# Patient Record
Sex: MALE | Race: WHITE | HISPANIC OR LATINO | Employment: FULL TIME | URBAN - METROPOLITAN AREA
[De-identification: names, ages, dates, MRNs, and addresses within clinical notes are randomized per-mention and may not be internally consistent; named-entity substitution may affect disease eponyms.]

---

## 2017-03-04 ENCOUNTER — HOSPITAL ENCOUNTER (EMERGENCY)
Facility: MEDICAL CENTER | Age: 52
End: 2017-03-04
Attending: EMERGENCY MEDICINE
Payer: COMMERCIAL

## 2017-03-04 VITALS
HEART RATE: 86 BPM | WEIGHT: 218.48 LBS | RESPIRATION RATE: 16 BRPM | HEIGHT: 66 IN | DIASTOLIC BLOOD PRESSURE: 102 MMHG | BODY MASS INDEX: 35.11 KG/M2 | SYSTOLIC BLOOD PRESSURE: 161 MMHG | OXYGEN SATURATION: 96 % | TEMPERATURE: 98.6 F

## 2017-03-04 DIAGNOSIS — M54.30 SCIATICA, UNSPECIFIED LATERALITY: ICD-10-CM

## 2017-03-04 DIAGNOSIS — S39.012A LUMBAR STRAIN, INITIAL ENCOUNTER: ICD-10-CM

## 2017-03-04 PROCEDURE — 99283 EMERGENCY DEPT VISIT LOW MDM: CPT

## 2017-03-04 RX ORDER — CYCLOBENZAPRINE HCL 10 MG
10 TABLET ORAL 3 TIMES DAILY PRN
Qty: 30 TAB | Refills: 0 | Status: SHIPPED | OUTPATIENT
Start: 2017-03-04

## 2017-03-04 RX ORDER — HYDROCODONE BITARTRATE AND ACETAMINOPHEN 7.5; 325 MG/1; MG/1
1 TABLET ORAL EVERY 8 HOURS PRN
Qty: 20 TAB | Refills: 0 | Status: SHIPPED | OUTPATIENT
Start: 2017-03-04

## 2017-03-04 RX ORDER — PREDNISONE 20 MG/1
20 TABLET ORAL DAILY
Qty: 6 TAB | Refills: 0 | Status: SHIPPED | OUTPATIENT
Start: 2017-03-04

## 2017-03-04 ASSESSMENT — LIFESTYLE VARIABLES: DO YOU DRINK ALCOHOL: NO

## 2017-03-04 NOTE — ED AVS SNAPSHOT
[employer] Access Code: [email or web address]  Expires: 4/3/2017 12:35 PM    Your email address is not on file at [facility].  Email Addresses are required for you to sign up for [employer], please contact [phone] to verify your personal information and to provide your email address prior to attempting to register for [employer].    Shaq Holbrook  [address] [place] [address]    [employer]  A secure, online tool to manage your health information     [facility]’s [employer]® is a secure, online tool that connects you to your personalized health information from the privacy of your home -- day or night - making it very easy for you to manage your healthcare. Once the activation process is completed, you can even access your medical information using the [employer] stephan, which is available for free in the Apple Stephan store or Google Play store.     To learn more about [employer], visit [email or web address]    There are two levels of access available (as shown below):   My Chart Features  [facility] Primary Care Doctor [facility]  Specialists [facility]  Urgent  Care Non-[facility] Primary Care Doctor   Email your healthcare team securely and privately 24/7 X X X    Manage appointments: schedule your next appointment; view details of past/upcoming appointments X      Request prescription refills. X      View recent personal medical records, including lab and immunizations X X X X   View health record, including health history, allergies, medications X X X X   Read reports about your outpatient visits, procedures, consult and ER notes X X X X   See your discharge summary, which is a recap of your hospital and/or ER visit that includes your diagnosis, lab results, and care plan X X  X     How to register for [employer]:  Once your e-mail address has been verified, follow the following steps to sign up for [employer].     1. Go to  [email or web address]  2. Click on the Sign Up Now box, which takes you to the New Member Sign Up page. You will  need to provide the following information:  a. Enter your [employer] Access Code exactly as it appears at the top of this page. (You will not need to use this code after you’ve completed the sign-up process. If you do not sign up before the expiration date, you must request a new code.)   b. Enter your date of birth.   c. Enter your home email address.   d. Click Submit, and follow the next screen’s instructions.  3. Create a [employer] ID. This will be your [employer] login ID and cannot be changed, so think of one that is secure and easy to remember.  4. Create a [employer] password. You can change your password at any time.  5. Enter your Password Reset Question and Answer. This can be used at a later time if you forget your password.   6. Enter your e-mail address. This allows you to receive e-mail notifications when new information is available in [employer].  7. Click Sign Up. You can now view your health information.    For assistance activating your [employer] account, call [phone]

## 2017-03-04 NOTE — ED PROVIDER NOTES
"ED Provider Note    CHIEF COMPLAINT  Chief Complaint   Patient presents with   • Low Back Pain     Pt BIB self to triage for c/o left side low back pain that radiates down left leg to knee/ reports to feels like burning. pt denies trauma.    • Leg Pain       HPI  Shaq Holbrook is a 51 y.o. male who presents complaining of some mild to moderate pain radiating down his left buttocks. Pain started over the past 3-5 days. No weakness in his arms or legs. No problems with his walking. No bowel or bladder dysfunction. No weight loss. He works as a cook. He does some lifting at home.    REVIEW OF SYSTEMS  No Bowel or bladder dysfunction. No weakness in his arms or legs.  ALL OTHER SYSTEMS NEGATIVE    ALLERGIES  No Known Allergies    PAST MEDICAL HISTORY  Negative    FAMILY HISTORY  Negative    SOCIAL HISTORY  Negative    PHYSICAL EXAM  GENERAL: Alert male adult  VITAL SIGNS: /102 mmHg  Pulse 86  Temp(Src) 37 °C (98.6 °F)  Resp 16  Ht 1.676 m (5' 6\")  Wt 99.1 kg (218 lb 7.6 oz)  BMI 35.28 kg/m2  SpO2 96%   Constitutional: Alert healthy-appearing adult HENT: Scalp is normal size and nontender. Ears   Eyes: Pupils equal round and reactive to light, extraocular motor fall. There is no scleral icterus.  Cardiovascular: Heart regular rhythm without murmurs or gallops   Thorax & Lungs: No chest wall tenderness. Lungs are clear. Patient has good breath sounds bilateral. No rales, no rhonchi, no wheezes.  Abdomen: Abdomen is soft, nontender, not rigid, no guarding, and no organomegaly. There is no palpable hernia   Skin: Warm, pink, and dry with no erythema and no rash.   Back: Nontender, no midline bony tenderness, no flank tenderness.  Extremities: Full range of motion  No tenderness to palpation and no deformities noted. No calf or thigh swelling. No calf or thigh tenderness. No clinical DVT.  Neurologic: Alert & oriented . Cranial nerves are grossly intact as tested. Patient moves all 4 extremities well. " Patient has good strong flexion and extension of the ankle joints knee joints hip joints and elbow joints. Sensation is normal and symmetrical in the upper and lower extremities.   Psychiatric: Patient is alert oriented coherent and rational.     COURSE & MEDICAL DECISION MAKING  Assessment musculoskeletal left sciatic pain. No neurological deficit. Stable for discharge.    Treatment: #1 icepack #2. Instructions on sciatica and back pain given #3. Follow up with the [facility] this week. #4. Prescription for prednisone, hydrocodone, Flexeril.    FINAL IMPRESSION  1. Sciatica and lumbar strain.      Electronically signed by: Gary Gansert, 3/4/2017 12:31 PM

## 2017-03-04 NOTE — DISCHARGE INSTRUCTIONS
Back Exercises  Back exercises help treat and prevent back injuries. The goal of back exercises is to increase the strength of your abdominal and back muscles and the flexibility of your back. These exercises should be started when you no longer have back pain. Back exercises include:  · Pelvic Tilt. Lie on your back with your knees bent. Tilt your pelvis until the lower part of your back is against the floor. Hold this position 5 to 10 sec and repeat 5 to 10 times.  · Knee to Chest. Pull first 1 knee up against your chest and hold for 20 to 30 seconds, repeat this with the other knee, and then both knees. This may be done with the other leg straight or bent, whichever feels better.  · Sit-Ups or Curl-Ups. Bend your knees 90 degrees. Start with tilting your pelvis, and do a partial, slow sit-up, lifting your trunk only 30 to 45 degrees off the floor. Take at least 2 to 3 seconds for each sit-up. Do not do sit-ups with your knees out straight. If partial sit-ups are difficult, simply do the above but with only tightening your abdominal muscles and holding it as directed.  · Hip-Lift. Lie on your back with your knees flexed 90 degrees. Push down with your feet and shoulders as you raise your hips a couple inches off the floor; hold for 10 seconds, repeat 5 to 10 times.  · Back arches. Lie on your stomach, propping yourself up on bent elbows. Slowly press on your hands, causing an arch in your low back. Repeat 3 to 5 times. Any initial stiffness and discomfort should lessen with repetition over time.  · Shoulder-Lifts. Lie face down with arms beside your body. Keep hips and torso pressed to floor as you slowly lift your head and shoulders off the floor.  Do not overdo your exercises, especially in the beginning. Exercises may cause you some mild back discomfort which lasts for a few minutes; however, if the pain is more severe, or lasts for more than 15 minutes, do not continue exercises until you see your caregiver.  Improvement with exercise therapy for back problems is slow.   See your caregivers for assistance with developing a proper back exercise program.     This information is not intended to replace advice given to you by your health care provider. Make sure you discuss any questions you have with your health care provider.     Document Released: 01/25/2006 Document Revised: 03/11/2013 Document Reviewed: 02/11/2016  [employer] Interactive Patient Education ©2016 [employer]    Gluteal Strain  The muscles in your butt (buttocks) are called gluteal muscles. A gluteal strain means that the muscles are stretched or have small tears. This can cause pain or stiffness in your buttocks. You also might feel pain in your lower back.   Anyone can strain gluteal muscles. However, it happens often to dancers, runners, and other athletes.  CAUSES   There are various causes of gluteal strain. They include:   · Stretching the gluteal muscles too far.  · Putting too much stress on the muscles before they are warmed up.  · Overusing the muscles.  · Getting hit hard on the gluteal muscles (a bruise or contusion).  Gluteal strain is more likely to happen when:  · You are working out in cold weather.  · You are tired.  · You are doing exercise that requires a sudden burst of activity. Sprinting is an example.  SYMPTOMS   · Pain in the buttocks when moving the leg. Sometimes the pain extends into the lower back.  · Tenderness in the buttocks.  · Stiffness or weakness of the buttocks.  · Bruising.  DIAGNOSIS   To decide if you have gluteal strain, a health care provider will probably:  · Ask what symptoms you have.  · Ask about the location of your pain, when it started, and when it occurs.  · Review your overall health.  · Physically examine the buttocks muscles.  · Have you do some hip motion exercises. They are called range of motion exercises. You will move your leg in specific ways, sometimes with the health care provider pushing against  your leg. This will show where the pain is coming from.  · Order a magnetic resonance imaging (MRI) scan. This machine uses a magnet and a computer to take pictures of your muscles and tendons. This can help find the cause of your pain. It also may show how severe the strain is. Your strain may be rated:  · Grade 1 strain (mild): Muscles are stretched. There might be very tiny tears. This type of strain should heal in about a week.  · Grade 2 strain (moderate): Muscles are partly torn. It may take one to two months to heal.  · Grade 3 strain (more severe): Muscles are ruptured (completely torn). This is rare with gluteal muscles. A severe strain can take more than three months to heal. A severe strain may require surgery, but this is rare.  TREATMENT   Several treatment methods can be used. Be sure to discuss the options with your health care provider. They include:  · Rest. Take a break from the activity that caused the injury.  · Cold packs. Applying cold to your buttocks may ease swelling and pain.  · Pain medication. Only take over-the-counter medicines for pain and discomfort as directed by your health care provider.  · Physical therapy. You will work on specific exercises to get the gluteal muscles back in shape. These exercises usually involve stretching. A physical therapist can show you what to do and what not to do as you heal.  HOME CARE INSTRUCTIONS   · Take any pain medication suggested by your health care provider. Follow the directions carefully.  · Use cold packs as directed by your health care provider or physical therapist.  · If you have physical therapy, follow through with the therapist's suggestions. Be sure you understand the exercises you will be doing, including:  ¨ How often the exercises should be done.  ¨ How many times each exercise should be repeated.  ¨ How long they should be done.  ¨ Other activities you should or should not do.  · Learn as much as you can about training for your  sport or exercise. This should help you avoid future muscle strains.  SEEK MEDICAL CARE IF:   · Pain, stiffness, or weakness gets worse.  · You have questions about any medications.     This information is not intended to replace advice given to you by your health care provider. Make sure you discuss any questions you have with your health care provider.     Document Released: 10/15/2010 Document Revised: 01/08/2016 Document Reviewed: 10/15/2010  [employer] Interactive Patient Education ©2016 [employer]    Lumbosacral Strain  Lumbosacral strain is a strain of any of the parts that make up your lumbosacral vertebrae. Your lumbosacral vertebrae are the bones that make up the lower third of your backbone. Your lumbosacral vertebrae are held together by muscles and tough, fibrous tissue (ligaments).   CAUSES   A sudden blow to your back can cause lumbosacral strain. Also, anything that causes an excessive stretch of the muscles in the low back can cause this strain. This is typically seen when people exert themselves strenuously, fall, lift heavy objects, bend, or crouch repeatedly.  RISK FACTORS  · Physically demanding work.  · Participation in pushing or pulling sports or sports that require a sudden twist of the back (tennis, golf, baseball).  · Weight lifting.  · Excessive lower back curvature.  · Forward-tilted pelvis.  · Weak back or abdominal muscles or both.  · Tight hamstrings.  SIGNS AND SYMPTOMS   Lumbosacral strain may cause pain in the area of your injury or pain that moves (radiates) down your leg.   DIAGNOSIS  Your health care provider can often diagnose lumbosacral strain through a physical exam. In some cases, you may need tests such as X-ray exams.   TREATMENT   Treatment for your lower back injury depends on many factors that your clinician will have to evaluate. However, most treatment will include the use of anti-inflammatory medicines.  HOME CARE INSTRUCTIONS   · Avoid hard physical activities  (tennis, racquetball, waterskiing) if you are not in proper physical condition for it. This may aggravate or create problems.  · If you have a back problem, avoid sports requiring sudden body movements. Swimming and walking are generally safer activities.  · Maintain good posture.  · Maintain a healthy weight.  · For acute conditions, you may put ice on the injured area.  ¨ Put ice in a plastic bag.  ¨ Place a towel between your skin and the bag.  ¨ Leave the ice on for 20 minutes, 2-3 times a day.  · When the low back starts healing, stretching and strengthening exercises may be recommended.  SEEK MEDICAL CARE IF:  · Your back pain is getting worse.  · You experience severe back pain not relieved with medicines.  SEEK IMMEDIATE MEDICAL CARE IF:   · You have numbness, tingling, weakness, or problems with the use of your arms or legs.  · There is a change in bowel or bladder control.  · You have increasing pain in any area of the body, including your belly (abdomen).  · You notice shortness of breath, dizziness, or feel faint.  · You feel sick to your stomach (nauseous), are throwing up (vomiting), or become sweaty.  · You notice discoloration of your toes or legs, or your feet get very cold.  MAKE SURE YOU:   · Understand these instructions.  · Will watch your condition.  · Will get help right away if you are not doing well or get worse.     This information is not intended to replace advice given to you by your health care provider. Make sure you discuss any questions you have with your health care provider.     Document Released: 09/27/2006 Document Revised: 01/08/2016 Document Reviewed: 08/06/2014  [employer] Interactive Patient Education ©2016 [employer]

## 2017-03-04 NOTE — ED NOTES
Chief Complaint   Patient presents with   • Low Back Pain     Pt BIB self to triage for c/o left side low back pain that radiates down left leg to knee/ reports to feels like burning. pt denies trauma.    • Leg Pain     Explained to pt triage process, made pt aware to tell this RN of any changes/concerns, pt verbalized understanding of process and instructions given. Pt to ER lobby.

## 2017-03-04 NOTE — ED AVS SNAPSHOT
Home Care Instructions                                                                                                                Shaq Holbrook   MRN: [id number]    Department:  [facility], Emergency Dept   Date of Visit:  3/4/2017            [facility], Emergency Dept    [address]    [place] [address]    Phone:  [phone]      You were seen by     Gary Gansert, M.D.      Your Diagnosis Was     Sciatica, unspecified laterality     M54.30       Follow-up Information     1. Follow up with [facility]. Schedule an appointment as soon as possible for a visit in 2 days.    Contact information    [facility]  [place] [address]  [phone]        Medication Information     Review all of your home medications and newly ordered medications with your primary doctor and/or pharmacist as soon as possible. Follow medication instructions as directed by your doctor and/or pharmacist.     Please keep your complete medication list with you and share with your physician. Update the information when medications are discontinued, doses are changed, or new medications (including over-the-counter products) are added; and carry medication information at all times in the event of emergency situations.               Medication List      START taking these medications        Instructions    cyclobenzaprine 10 MG Tabs   Commonly known as:  FLEXERIL    Take 1 Tab by mouth 3 times a day as needed.   Dose:  10 mg       hydrocodone-acetaminophen 7.5-325 MG per tablet   Commonly known as:  NORCO    Take 1 Tab by mouth every 8 hours as needed.   Dose:  1 Tab       predniSONE 20 MG Tabs   Commonly known as:  DELTASONE    Take 1 Tab by mouth every day.   Dose:  20 mg               Procedures and tests performed during your visit     NURSING COMMUNICATION        Discharge Instructions       Back Exercises  Back exercises help treat and prevent back injuries. The goal of back exercises is  to increase the strength of your abdominal and back muscles and the flexibility of your back. These exercises should be started when you no longer have back pain. Back exercises include:  · Pelvic Tilt. Lie on your back with your knees bent. Tilt your pelvis until the lower part of your back is against the floor. Hold this position 5 to 10 sec and repeat 5 to 10 times.  · Knee to Chest. Pull first 1 knee up against your chest and hold for 20 to 30 seconds, repeat this with the other knee, and then both knees. This may be done with the other leg straight or bent, whichever feels better.  · Sit-Ups or Curl-Ups. Bend your knees 90 degrees. Start with tilting your pelvis, and do a partial, slow sit-up, lifting your trunk only 30 to 45 degrees off the floor. Take at least 2 to 3 seconds for each sit-up. Do not do sit-ups with your knees out straight. If partial sit-ups are difficult, simply do the above but with only tightening your abdominal muscles and holding it as directed.  · Hip-Lift. Lie on your back with your knees flexed 90 degrees. Push down with your feet and shoulders as you raise your hips a couple inches off the floor; hold for 10 seconds, repeat 5 to 10 times.  · Back arches. Lie on your stomach, propping yourself up on bent elbows. Slowly press on your hands, causing an arch in your low back. Repeat 3 to 5 times. Any initial stiffness and discomfort should lessen with repetition over time.  · Shoulder-Lifts. Lie face down with arms beside your body. Keep hips and torso pressed to floor as you slowly lift your head and shoulders off the floor.  Do not overdo your exercises, especially in the beginning. Exercises may cause you some mild back discomfort which lasts for a few minutes; however, if the pain is more severe, or lasts for more than 15 minutes, do not continue exercises until you see your caregiver. Improvement with exercise therapy for back problems is slow.   See your caregivers for assistance  with developing a proper back exercise program.     This information is not intended to replace advice given to you by your health care provider. Make sure you discuss any questions you have with your health care provider.     Document Released: 01/25/2006 Document Revised: 03/11/2013 Document Reviewed: 02/11/2016  [employer] Interactive Patient Education ©2016 [employer]    Gluteal Strain  The muscles in your butt (buttocks) are called gluteal muscles. A gluteal strain means that the muscles are stretched or have small tears. This can cause pain or stiffness in your buttocks. You also might feel pain in your lower back.   Anyone can strain gluteal muscles. However, it happens often to dancers, runners, and other athletes.  CAUSES   There are various causes of gluteal strain. They include:   · Stretching the gluteal muscles too far.  · Putting too much stress on the muscles before they are warmed up.  · Overusing the muscles.  · Getting hit hard on the gluteal muscles (a bruise or contusion).  Gluteal strain is more likely to happen when:  · You are working out in cold weather.  · You are tired.  · You are doing exercise that requires a sudden burst of activity. Sprinting is an example.  SYMPTOMS   · Pain in the buttocks when moving the leg. Sometimes the pain extends into the lower back.  · Tenderness in the buttocks.  · Stiffness or weakness of the buttocks.  · Bruising.  DIAGNOSIS   To decide if you have gluteal strain, a health care provider will probably:  · Ask what symptoms you have.  · Ask about the location of your pain, when it started, and when it occurs.  · Review your overall health.  · Physically examine the buttocks muscles.  · Have you do some hip motion exercises. They are called range of motion exercises. You will move your leg in specific ways, sometimes with the health care provider pushing against your leg. This will show where the pain is coming from.  · Order a magnetic resonance imaging  (MRI) scan. This machine uses a magnet and a computer to take pictures of your muscles and tendons. This can help find the cause of your pain. It also may show how severe the strain is. Your strain may be rated:  · Grade 1 strain (mild): Muscles are stretched. There might be very tiny tears. This type of strain should heal in about a week.  · Grade 2 strain (moderate): Muscles are partly torn. It may take one to two months to heal.  · Grade 3 strain (more severe): Muscles are ruptured (completely torn). This is rare with gluteal muscles. A severe strain can take more than three months to heal. A severe strain may require surgery, but this is rare.  TREATMENT   Several treatment methods can be used. Be sure to discuss the options with your health care provider. They include:  · Rest. Take a break from the activity that caused the injury.  · Cold packs. Applying cold to your buttocks may ease swelling and pain.  · Pain medication. Only take over-the-counter medicines for pain and discomfort as directed by your health care provider.  · Physical therapy. You will work on specific exercises to get the gluteal muscles back in shape. These exercises usually involve stretching. A physical therapist can show you what to do and what not to do as you heal.  HOME CARE INSTRUCTIONS   · Take any pain medication suggested by your health care provider. Follow the directions carefully.  · Use cold packs as directed by your health care provider or physical therapist.  · If you have physical therapy, follow through with the therapist's suggestions. Be sure you understand the exercises you will be doing, including:  ¨ How often the exercises should be done.  ¨ How many times each exercise should be repeated.  ¨ How long they should be done.  ¨ Other activities you should or should not do.  · Learn as much as you can about training for your sport or exercise. This should help you avoid future muscle strains.  SEEK MEDICAL CARE IF:    · Pain, stiffness, or weakness gets worse.  · You have questions about any medications.     This information is not intended to replace advice given to you by your health care provider. Make sure you discuss any questions you have with your health care provider.     Document Released: 10/15/2010 Document Revised: 01/08/2016 Document Reviewed: 10/15/2010  [employer] Interactive Patient Education ©2016 [employer]    Lumbosacral Strain  Lumbosacral strain is a strain of any of the parts that make up your lumbosacral vertebrae. Your lumbosacral vertebrae are the bones that make up the lower third of your backbone. Your lumbosacral vertebrae are held together by muscles and tough, fibrous tissue (ligaments).   CAUSES   A sudden blow to your back can cause lumbosacral strain. Also, anything that causes an excessive stretch of the muscles in the low back can cause this strain. This is typically seen when people exert themselves strenuously, fall, lift heavy objects, bend, or crouch repeatedly.  RISK FACTORS  · Physically demanding work.  · Participation in pushing or pulling sports or sports that require a sudden twist of the back (tennis, golf, baseball).  · Weight lifting.  · Excessive lower back curvature.  · Forward-tilted pelvis.  · Weak back or abdominal muscles or both.  · Tight hamstrings.  SIGNS AND SYMPTOMS   Lumbosacral strain may cause pain in the area of your injury or pain that moves (radiates) down your leg.   DIAGNOSIS  Your health care provider can often diagnose lumbosacral strain through a physical exam. In some cases, you may need tests such as X-ray exams.   TREATMENT   Treatment for your lower back injury depends on many factors that your clinician will have to evaluate. However, most treatment will include the use of anti-inflammatory medicines.  HOME CARE INSTRUCTIONS   · Avoid hard physical activities (tennis, racquetball, waterskiing) if you are not in proper physical condition for it. This may  aggravate or create problems.  · If you have a back problem, avoid sports requiring sudden body movements. Swimming and walking are generally safer activities.  · Maintain good posture.  · Maintain a healthy weight.  · For acute conditions, you may put ice on the injured area.  ¨ Put ice in a plastic bag.  ¨ Place a towel between your skin and the bag.  ¨ Leave the ice on for 20 minutes, 2-3 times a day.  · When the low back starts healing, stretching and strengthening exercises may be recommended.  SEEK MEDICAL CARE IF:  · Your back pain is getting worse.  · You experience severe back pain not relieved with medicines.  SEEK IMMEDIATE MEDICAL CARE IF:   · You have numbness, tingling, weakness, or problems with the use of your arms or legs.  · There is a change in bowel or bladder control.  · You have increasing pain in any area of the body, including your belly (abdomen).  · You notice shortness of breath, dizziness, or feel faint.  · You feel sick to your stomach (nauseous), are throwing up (vomiting), or become sweaty.  · You notice discoloration of your toes or legs, or your feet get very cold.  MAKE SURE YOU:   · Understand these instructions.  · Will watch your condition.  · Will get help right away if you are not doing well or get worse.     This information is not intended to replace advice given to you by your health care provider. Make sure you discuss any questions you have with your health care provider.     Document Released: 09/27/2006 Document Revised: 01/08/2016 Document Reviewed: 08/06/2014  [employer] Interactive Patient Education ©2016 [employer]            Patient Information     Patient Information    Following emergency treatment: all patient requiring follow-up care must return either to a private physician or a clinic if your condition worsens before you are able to obtain further medical attention, please return to the emergency room.     Billing Information    At [facility], we work to  make the billing process streamlined for our patients.  Our Representatives are here to answer any questions you may have regarding your hospital bill.  If you have insurance coverage and have supplied your insurance information to us, we will submit a claim to your insurer on your behalf.  Should you have any questions regarding your bill, we can be reached online or by phone as follows:  Online: You are able pay your bills online or live chat with our representatives about any billing questions you may have. We are here to help Monday - Friday from 8:00am to 7:30pm and 9:00am - 12:00pm on Saturdays.  Please visit [email or web address]  for more information.   Phone:  [phone] or [phone]    Please note that your emergency physician, surgeon, pathologist, radiologist, anesthesiologist, and other specialists are not employed by [facility] and will therefore bill separately for their services.  Please contact them directly for any questions concerning their bills at the numbers below:     Emergency Physician Services:  [phone]  [facility]:  [phone]  [facility]:  [phone]  [facility]:  [phone]    1. Your final bill may vary from the amount quoted upon discharge if all procedures are not complete at that time, or if your doctor has additional procedures of which we are not aware. You will receive an additional bill if you return to the Emergency Department at [facility] for suture removal regardless of the facility of which the sutures were placed.     2. Please arrange for settlement of this account at the emergency registration.    3. All self-pay accounts are due in full at the time of treatment.  If you are unable to meet this obligation then payment is expected within 4-5 days.     4. If you have had radiology studies (CT, X-ray, Ultrasound, MRI), you have received a preliminary result during your  emergency department visit. Please contact the radiology department [phone] to receive a copy of your final result. Please discuss the Final result with your primary physician or with the follow up physician provided.     Crisis Hotline:  [place] Crisis Hotline:  7-216-UJFELDL or 1-779.876.5892  [place] Crisis Hotline:    1-643.878.8177 or 559-001-7293         ED Discharge Follow Up Questions    1. In order to provide you with very good care, we would like to follow up with a phone call in the next few days.  May we have your permission to contact you?     YES /  NO    2. What is the best phone number to call you? (       )_____-__________    3. What is the best time to call you?      Morning  /  Afternoon  /  Evening                   Patient Signature:  ____________________________________________________________    Date:  ____________________________________________________________

## 2017-03-04 NOTE — ED AVS SNAPSHOT
3/4/2017          Shaq Holbrook  [address] [place] [address]    Dear Shaq:    [facility] wants to ensure your discharge home is safe and you or your loved ones have had all your questions answered regarding your care after you leave the hospital.    You may receive a telephone call within two days of your discharge.  This call is to make certain you understand your discharge instructions as well as ensure we provided you with the best care possible during your stay with us.     The call will only last approximately 3-5 minutes and will be done by a nurse.    Once again, we want to ensure your discharge home is safe and that you have a clear understanding of any next steps in your care.  If you have any questions or concerns, please do not hesitate to contact us, we are here for you.  Thank you for choosing [facility] for your healthcare needs.    Sincerely,    Oziel Hendricks    [facility]

## 2020-06-02 ENCOUNTER — HOSPITAL ENCOUNTER (OUTPATIENT)
Facility: MEDICAL CENTER | Age: 55
End: 2020-06-02
Payer: COMMERCIAL

## 2020-06-04 LAB
SARS-COV-2 RNA SPEC QL NAA+PROBE: NOT DETECTED
SPECIMEN SOURCE: NORMAL

## 2020-07-29 ENCOUNTER — HOSPITAL ENCOUNTER (OUTPATIENT)
Facility: MEDICAL CENTER | Age: 55
End: 2020-07-29
Payer: COMMERCIAL